# Patient Record
Sex: FEMALE | Race: WHITE | NOT HISPANIC OR LATINO | URBAN - METROPOLITAN AREA
[De-identification: names, ages, dates, MRNs, and addresses within clinical notes are randomized per-mention and may not be internally consistent; named-entity substitution may affect disease eponyms.]

---

## 2018-01-16 ENCOUNTER — IMPORTED ENCOUNTER (OUTPATIENT)
Dept: URBAN - METROPOLITAN AREA CLINIC 50 | Facility: CLINIC | Age: 73
End: 2018-01-16

## 2018-01-18 ENCOUNTER — IMPORTED ENCOUNTER (OUTPATIENT)
Dept: URBAN - METROPOLITAN AREA CLINIC 50 | Facility: CLINIC | Age: 73
End: 2018-01-18

## 2019-01-31 ENCOUNTER — IMPORTED ENCOUNTER (OUTPATIENT)
Dept: URBAN - METROPOLITAN AREA CLINIC 50 | Facility: CLINIC | Age: 74
End: 2019-01-31

## 2020-01-30 ENCOUNTER — IMPORTED ENCOUNTER (OUTPATIENT)
Dept: URBAN - METROPOLITAN AREA CLINIC 50 | Facility: CLINIC | Age: 75
End: 2020-01-30

## 2020-02-28 ENCOUNTER — IMPORTED ENCOUNTER (OUTPATIENT)
Dept: URBAN - METROPOLITAN AREA CLINIC 50 | Facility: CLINIC | Age: 75
End: 2020-02-28

## 2021-03-05 ENCOUNTER — IMPORTED ENCOUNTER (OUTPATIENT)
Dept: URBAN - METROPOLITAN AREA CLINIC 50 | Facility: CLINIC | Age: 76
End: 2021-03-05

## 2021-04-18 ASSESSMENT — TONOMETRY
OD_IOP_MMHG: 13
OS_IOP_MMHG: 14
OS_IOP_MMHG: 19
OD_IOP_MMHG: 16
OS_IOP_MMHG: 17
OS_IOP_MMHG: 18
OS_IOP_MMHG: 13
OS_IOP_MMHG: 18
OS_IOP_MMHG: 15
OD_IOP_MMHG: 14
OD_IOP_MMHG: 16
OS_IOP_MMHG: 17
OS_IOP_MMHG: 17
OS_IOP_MMHG: 13
OS_IOP_MMHG: 13
OD_IOP_MMHG: 15
OD_IOP_MMHG: 15
OD_IOP_MMHG: 17
OS_IOP_MMHG: 14
OD_IOP_MMHG: 15
OD_IOP_MMHG: 14
OD_IOP_MMHG: 13

## 2021-04-18 ASSESSMENT — PACHYMETRY
OS_CT_UM: 4.1
OD_CT_UM: 496
OS_CT_UM: 4.1
OD_CT_UM: 496
OS_CT_UM: 4.1
OD_CT_UM: 496
OD_CT_UM: 496
OS_CT_UM: 4.1

## 2021-04-18 ASSESSMENT — VISUAL ACUITY
OD_BAT: 20/70
OD_CC: 20/25-
OD_OTHER: 20/25. 20/25.
OD_BAT: >20/400
OD_CC: 20/30
OS_CC: 20/25
OS_BAT: 20/40
OD_OTHER: 20/70. 20/100.
OD_CC: 20/30+2
OS_CC: J1+
OD_CC: J1+
OS_CC: J1+@ 16 IN
OD_PH: 20/30
OD_BAT: 20/40
OD_CC: J1+
OS_OTHER: 20/40. 20/60.
OS_CC: J1+
OD_BAT: 20/25
OD_CC: J1+@ 16 IN
OD_CC: J1+
OS_CC: 20/25-2
OD_CC: 20/30
OD_PH: 20/25
OS_BAT: >20/400
OD_OTHER: 20/25-. 20/40-.
OD_CC: 20/40
OS_CC: 20/30+
OD_OTHER: 20/40. 20/50.
OS_BAT: 20/50
OS_OTHER: >20/400.
OD_PH: 20/20-2
OD_OTHER: >20/400.
OD_BAT: 20/25-
OS_CC: 20/30
OS_CC: 20/30-1
OS_OTHER: 20/50. 20/60.
OS_CC: J1+

## 2022-03-07 ENCOUNTER — PREPPED CHART (OUTPATIENT)
Dept: URBAN - METROPOLITAN AREA CLINIC 52 | Facility: CLINIC | Age: 77
End: 2022-03-07

## 2022-03-11 ENCOUNTER — COMPREHENSIVE EXAM (OUTPATIENT)
Dept: URBAN - METROPOLITAN AREA CLINIC 52 | Facility: CLINIC | Age: 77
End: 2022-03-11

## 2022-03-11 DIAGNOSIS — H35.371: ICD-10-CM

## 2022-03-11 DIAGNOSIS — H02.88A: ICD-10-CM

## 2022-03-11 DIAGNOSIS — H26.491: ICD-10-CM

## 2022-03-11 DIAGNOSIS — H04.123: ICD-10-CM

## 2022-03-11 PROCEDURE — 92134 CPTRZ OPH DX IMG PST SGM RTA: CPT

## 2022-03-11 PROCEDURE — 92014 COMPRE OPH EXAM EST PT 1/>: CPT

## 2022-03-11 ASSESSMENT — VISUAL ACUITY
OD_CC: 20/20-1
OU_CC: J1+@ 16 IN
OD_GLARE: 20/25
OD_GLARE: 20/30
OS_CC: 20/25-2
OU_CC: 20/20

## 2022-03-11 ASSESSMENT — TONOMETRY
OD_IOP_MMHG: 13
OS_IOP_MMHG: 12

## 2022-03-11 NOTE — PATIENT DISCUSSION
Recommended patient to do warm compresses for 5-10 minutes at a time bid. Start taking Omega 3 fatty acids, flaxseed oil. Increase AT to qid OU. Early morning, mid morning, early afternoon, and late afternoon.

## 2022-04-08 ENCOUNTER — FOLLOW UP (OUTPATIENT)
Dept: URBAN - METROPOLITAN AREA CLINIC 52 | Facility: CLINIC | Age: 77
End: 2022-04-08

## 2022-04-08 DIAGNOSIS — H02.88B: ICD-10-CM

## 2022-04-08 DIAGNOSIS — H02.88A: ICD-10-CM

## 2022-04-08 PROCEDURE — 92012 INTRM OPH EXAM EST PATIENT: CPT

## 2022-04-08 ASSESSMENT — TONOMETRY
OD_IOP_MMHG: 12
OS_IOP_MMHG: 12

## 2022-04-08 ASSESSMENT — VISUAL ACUITY
OD_CC: 20/20-1
OS_CC: 20/25-1/+1
OU_CC: 20/20

## 2022-04-08 NOTE — PATIENT DISCUSSION
Recommended patient to continue doing warm compresses for 5-10 minutes at a time bid. Continue AT qid OU. Early morning, mid morning, early afternoon, and late afternoon.

## 2023-05-19 ENCOUNTER — COMPREHENSIVE EXAM (OUTPATIENT)
Dept: URBAN - METROPOLITAN AREA CLINIC 52 | Facility: CLINIC | Age: 78
End: 2023-05-19

## 2023-05-19 DIAGNOSIS — H04.123: ICD-10-CM

## 2023-05-19 DIAGNOSIS — D31.31: ICD-10-CM

## 2023-05-19 PROCEDURE — 92134 CPTRZ OPH DX IMG PST SGM RTA: CPT

## 2023-05-19 PROCEDURE — 92014 COMPRE OPH EXAM EST PT 1/>: CPT

## 2023-05-19 ASSESSMENT — VISUAL ACUITY
OD_CC: 20/20
OS_CC: 20/25
OD_GLARE: 20/30
OU_CC: J1+
OD_GLARE: 20/20

## 2023-05-19 ASSESSMENT — TONOMETRY
OD_IOP_MMHG: 12
OS_IOP_MMHG: 12

## 2024-05-24 ENCOUNTER — COMPREHENSIVE EXAM (OUTPATIENT)
Dept: URBAN - METROPOLITAN AREA CLINIC 48 | Facility: LOCATION | Age: 79
End: 2024-05-24

## 2024-05-24 DIAGNOSIS — H26.491: ICD-10-CM

## 2024-05-24 DIAGNOSIS — H02.88B: ICD-10-CM

## 2024-05-24 DIAGNOSIS — H04.123: ICD-10-CM

## 2024-05-24 DIAGNOSIS — H02.88A: ICD-10-CM

## 2024-05-24 DIAGNOSIS — H43.813: ICD-10-CM

## 2024-05-24 DIAGNOSIS — H52.4: ICD-10-CM

## 2024-05-24 DIAGNOSIS — D31.31: ICD-10-CM

## 2024-05-24 PROCEDURE — 92134 CPTRZ OPH DX IMG PST SGM RTA: CPT

## 2024-05-24 PROCEDURE — 92015 DETERMINE REFRACTIVE STATE: CPT

## 2024-05-24 PROCEDURE — 92014 COMPRE OPH EXAM EST PT 1/>: CPT

## 2024-05-24 RX ORDER — CYCLOSPORINE 0.5 MG/ML
1 EMULSION OPHTHALMIC TWICE A DAY
Start: 2024-05-24

## 2024-05-24 ASSESSMENT — VISUAL ACUITY
OU_CC: 20/20
OS_SC: 20/30+2
OD_SC: 20/30-2
OS_CC: J1+
OU_CC: J1+
OD_CC: J1+
OU_SC: 20/25
OS_CC: 20/20-2
OD_GLARE: 20/20
OD_CC: 20/20-1

## 2024-05-24 ASSESSMENT — TONOMETRY
OD_IOP_MMHG: 11
OS_IOP_MMHG: 12

## 2025-05-28 ENCOUNTER — COMPREHENSIVE EXAM (OUTPATIENT)
Age: 80
End: 2025-05-28

## 2025-05-28 DIAGNOSIS — Z01.01: ICD-10-CM

## 2025-05-28 DIAGNOSIS — H52.4: ICD-10-CM

## 2025-05-28 PROCEDURE — 92014 COMPRE OPH EXAM EST PT 1/>: CPT

## 2025-05-28 PROCEDURE — 92015 DETERMINE REFRACTIVE STATE: CPT
